# Patient Record
Sex: MALE | Race: WHITE | Employment: UNEMPLOYED
[De-identification: names, ages, dates, MRNs, and addresses within clinical notes are randomized per-mention and may not be internally consistent; named-entity substitution may affect disease eponyms.]

---

## 2022-02-08 ENCOUNTER — NURSE TRIAGE (OUTPATIENT)
Dept: OTHER | Facility: CLINIC | Age: 27
End: 2022-02-08

## 2022-02-08 NOTE — TELEPHONE ENCOUNTER
Received call from Alejandro Perdomo at [facility] with [employer] Complaint; Patient with Red Flag Complaint requesting to establish care with Dr. Boris Grullon. Subjective: Caller states \"Last night he still just has this constant headache. Everyday now. Not the extreme dizzy he had, but an off balance type feeling. The fatigue, just doesn't seem to be getting any better at all. \"     Covid + on 1/12/22, was seen at [facility] ED on 1/19 and treated for early stages of pneumonia with steroids and abx. Limited Triage d/t patient not being with caller    Current Symptoms: Slight dry cough-improved, constant bad headache, fatigue, dizzy spells    Family Hx of Vertigo    Onset: 1 weeks ago; worsening d/t dizziness and headache more severe    Associated Symptoms: increased sleepiness    Pain Severity: Patient states \"bad\" headache    Temperature: Denies     What has been tried: Ibuprofen-helped, Vertigo medication (Meclazine) from Father/brother that have Vertigo- helped, Dramamine- helps, Electrolyte drinks    LMP: NA Pregnant: NA    Recommended disposition: See PCP within 24 Hours. Writer did advise mother for patient to be seen at [facility]/ED if unable to get a new patient appointment in the next 24 hours. Mother states she has an appointment scheduled for today at [phone] at [facility] for patient to be seen. She will take him to that appointment, but would like to schedule appointment for him to establish care with new PCP as well. Care advice provided, patient verbalizes understanding; denies any other questions or concerns; instructed to call back for any new or worsening symptoms. Writer provided warm transfer to [place] at [facility] for appointment scheduling. Attention Provider: Thank you for allowing me to participate in the care of your patient. The patient was connected to triage in response to information provided to the [facility].   Please do not respond through this encounter as the response is not directed to a shared pool.    Reason for Disposition   [1] PERSISTING SYMPTOMS OF COVID-19 AND [2] symptoms WORSE    Protocols used: CORONAVIRUS (COVID-19) PERSISTING SYMPTOMS FOLLOW-UP CALL-ADULT-

## 2022-04-19 ENCOUNTER — OFFICE VISIT (OUTPATIENT)
Dept: INTERNAL MEDICINE CLINIC | Age: 27
End: 2022-04-19
Payer: COMMERCIAL

## 2022-04-19 VITALS
DIASTOLIC BLOOD PRESSURE: 90 MMHG | RESPIRATION RATE: 16 BRPM | TEMPERATURE: 98.7 F | HEIGHT: 72 IN | BODY MASS INDEX: 38.41 KG/M2 | HEART RATE: 99 BPM | WEIGHT: 283.6 LBS | OXYGEN SATURATION: 98 % | SYSTOLIC BLOOD PRESSURE: 140 MMHG

## 2022-04-19 DIAGNOSIS — Z71.85 VACCINE COUNSELING: ICD-10-CM

## 2022-04-19 DIAGNOSIS — Z00.00 LABORATORY TESTS ORDERED AS PART OF A COMPLETE PHYSICAL EXAM (CPE): ICD-10-CM

## 2022-04-19 DIAGNOSIS — Z00.00 PHYSICAL EXAM: Primary | ICD-10-CM

## 2022-04-19 DIAGNOSIS — Z86.16 HISTORY OF COVID-19: ICD-10-CM

## 2022-04-19 DIAGNOSIS — E66.01 CLASS 2 SEVERE OBESITY DUE TO EXCESS CALORIES WITH SERIOUS COMORBIDITY AND BODY MASS INDEX (BMI) OF 38.0 TO 38.9 IN ADULT (HCC): ICD-10-CM

## 2022-04-19 DIAGNOSIS — I10 ESSENTIAL HYPERTENSION: ICD-10-CM

## 2022-04-19 PROCEDURE — 99204 OFFICE O/P NEW MOD 45 MIN: CPT | Performed by: NURSE PRACTITIONER

## 2022-04-19 PROCEDURE — 93000 ELECTROCARDIOGRAM COMPLETE: CPT | Performed by: NURSE PRACTITIONER

## 2022-04-19 RX ORDER — VALSARTAN AND HYDROCHLOROTHIAZIDE 80; 12.5 MG/1; MG/1
1 TABLET, FILM COATED ORAL DAILY
Qty: 90 TABLET | Refills: 0 | Status: SHIPPED | OUTPATIENT
Start: 2022-04-19

## 2022-04-19 NOTE — PROGRESS NOTES
Saad Mendoza is a 33 y.o. male    Chief Complaint   Patient presents with    \[facility] [place] [facility]\"" Care     New patient       Visit Vitals  BP (!) 140/90 (BP 1 Location: Left upper arm, BP Patient Position: Sitting, BP Cuff Size: Large adult)   Pulse 99   Temp 98.7 °F (37.1 °C)   Resp 16   Ht 6' (1.829 m)   Wt 283 lb 9.6 oz (128.6 kg)   SpO2 98%   BMI 38.46 kg/m²           1. Have you been to the ER, urgent care clinic since your last visit? Hospitalized since your last visit? NO    2. Have you seen or consulted any other health care providers outside of the [address] [place] [address] since your last visit? Include any pap smears or colon screening.  NO

## 2022-04-19 NOTE — PROGRESS NOTES
[facility]    Chief Complaint   Patient presents with    Establish Care     New patient       SUBJECTIVE:    Flor León is a 33 y.o. male who is here today to establish care as a new patient, and to discuss current concerns regarding blood pressure. The patient states he was seen a few months ago at [facility] ER for evaluation and treatment of COVID-19 infection. At that time, he was found to have an excessively high blood pressure reading, and was recommended to find a PCP to treat this. He was not given medication for his blood pressure at that time. He denies any symptoms including: Chest pain, chest pressure, shortness of breath, headaches, dizziness, blurred vision, palpitations, or syncope episodes. He has not been checking his blood pressures since he was told he had an elevated blood pressure. He has not been vaccinated for COVID-19. He is unsure if he intends to get this or not. Current Outpatient Medications   Medication Sig Dispense Refill    valsartan-hydroCHLOROthiazide (DIOVAN-HCT) 80-12.5 mg per tablet Take 1 Tablet by mouth daily. 90 Tablet 0    oxyCODONE-acetaminophen (PERCOCET) 5-325 mg per tablet Take 2 Tabs by mouth every six (6) hours as needed for Pain. (Patient not taking: Reported on 4/19/2022) 50 Tab 0    oxyCODONE-acetaminophen (PERCOCET) 5-325 mg per tablet Take 2 Tabs by mouth every six (6) hours as needed for Pain. (Patient not taking: Reported on 4/19/2022) 50 Tab 0    oxyCODONE-acetaminophen (PERCOCET) 5-325 mg per tablet Take 1 Tab by mouth every four (4) hours as needed. (Patient not taking: Reported on 4/19/2022) 40 Tab 0    MULTIVITAMIN (MULTIPLE VITAMINS PO) Take 1 Tab by mouth daily. (Patient not taking: Reported on 4/19/2022)       Past Medical History:   Diagnosis Date    HX OTHER MEDICAL dec.  2011    pilonidal cysts    Obesity     Other ill-defined conditions(799.89)     pilonidal cysts with surgery    Pilonidal cyst surgery 5/2013     Past Surgical History:   Procedure Laterality Date    HX OTHER SURGICAL      PILONIDAL CYST X4    HX OTHER SURGICAL      5 pilondial cyst surgeries     Allergies   Allergen Reactions    Penicillins Rash    Penicillins Rash    Sulfa (Sulfonamide Antibiotics) Hives    Sulfa (Sulfonamide Antibiotics) Rash       REVIEW OF SYSTEMS:                                        POSITIVE= bold text  Negative = regular text    General:                     fever, chills, sweats, generalized weakness, weight loss/gain,                                       loss of appetite   Eyes:                           blurred vision, eye pain, loss of vision, double vision  ENT:                            rhinorrhea, pharyngitis   Respiratory:               cough, sputum production, SOB, BARRON, wheezing, pleuritic pain   Cardiology:                chest pain, palpitations, orthopnea, PND, edema, syncope   Gastrointestinal:       abdominal pain , N/V, diarrhea, dysphagia, constipation, bleeding   Genitourinary:           frequency, urgency, dysuria, hematuria, incontinence   Muskuloskeletal :      arthralgia, myalgia, back pain  Hematology:              easy bruising, nose or gum bleeding, lymphadenopathy   Dermatological:         rash, ulceration, pruritis, color change / jaundice  Endocrine:                 hot flashes or polydipsia   Neurological:             headache, dizziness, confusion, focal weakness, paresthesia,                                      Speech difficulties, memory loss, gait difficulty  Psychological:          Feelings of anxiety, depression, agitation        Social History     Socioeconomic History    Marital status: SINGLE   Tobacco Use    Smoking status: Never Smoker    Smokeless tobacco: Never Used   Vaping Use    Vaping Use: Never used   Substance and Sexual Activity    Alcohol use: No    Drug use: No   Social History Narrative    ** Merged History Encounter **          Family History Problem Relation Age of Onset    Post-op Nausea/Vomiting Mother     Hypertension Paternal Grandfather        OBJECTIVE:     Visit Vitals  BP (!) 140/90 (BP 1 Location: Left upper arm, BP Patient Position: Sitting, BP Cuff Size: Large adult)   Pulse 99   Temp 98.7 °F (37.1 °C)   Resp 16   Ht 6' (1.829 m)   Wt 283 lb 9.6 oz (128.6 kg)   SpO2 98%   BMI 38.46 kg/m²       Constitutional: He appears well nourished, of stated age, and dressed appropriately. Eyes: Sclera anicteric, PERRLA, EOMI  Neck: Supple without lymphadenopathy. Thyroid normal, No JVD or bruits  Respiratory: Clear to ascultation X5, normal inspiratory effort, no adventitious breath sounds. Cardiovascular: Regular rate and rhythm, no significant murmurs, rubs or gallops, PMI not displaced, No thrills, no peripheral edema  Hematologic: No purpura, petechiae or unexplained bruising  Lymphatic: No lymph node enlargemant. Integumentary: No unusual rashes or suspicious skin lesions noted. Neuro: Non-focal exam, A & O X 3.   Psychiatric: Appropriate affect and demeanor, pleasant and cooperative. Patient's thought content and thought processing appear to be within normal limits. ASSESSMENT/PLAN:     ICD-10-CM ICD-9-CM    1. Physical exam  Z00.00 V70.9    2. Essential hypertension  I10 401.9 AMB POC EKG ROUTINE W/ 12 LEADS, INTER & REP      valsartan-hydroCHLOROthiazide (DIOVAN-HCT) 80-12.5 mg per tablet      CBC W/O DIFF      METABOLIC PANEL, COMPREHENSIVE      LIPID PANEL      TSH 3RD GENERATION   3. Class 2 severe obesity due to excess calories with serious comorbidity and body mass index (BMI) of 38.0 to 38.9 in adult (HCC)  E66.01 278.01     Z68.38 V85.38    4. Laboratory tests ordered as part of a complete physical exam (CPE)  Z00.00 V72.62 CBC W/O DIFF      METABOLIC PANEL, COMPREHENSIVE      LIPID PANEL      TSH 3RD GENERATION      URINALYSIS W/ RFLX MICROSCOPIC   5. History of COVID-19  Z86.16 V12.09    6.  Vaccine counseling  Z71.85 V65.49 1: Patient to return for fasting labs in the next 2 weeks including: CBC, CMP, lipid panel, TSH, and urinalysis. 2: Patient will be started on valsartan/HCTZ 80/12.5 mg 1 tablet each morning. Monitor blood pressures twice daily and record results. Bring to next appointment. 3: Work on healthy lifestyle management including: Low-fat/low-cholesterol diet, avoidance of concentrated sweets, adequate amounts of fiber and water, and regular exercise such as walking. 4: Patient counseled on COVID-19 vaccination today. 5: Patient to follow-up with me in approximately 6 weeks, or sooner as needed. Patient states understanding and agrees with plan. Orders Placed This Encounter    CBC W/O DIFF    METABOLIC PANEL, COMPREHENSIVE    LIPID PANEL    TSH 3RD GENERATION    URINALYSIS W/ RFLX MICROSCOPIC    AMB POC EKG ROUTINE W/ 12 LEADS, INTER & REP    valsartan-hydroCHLOROthiazide (DIOVAN-HCT) 80-12.5 mg per tablet         ATTENTION:   This medical record was transcribed using an electronic medical records system. Although proofread, it may and can contain electronic and spelling errors. Other human spelling and other errors may be present. Corrections may be executed at a later time. Please feel free to contact us for any clarifications as needed. Follow-up and Dispositions    · Return in about 6 weeks (around 5/31/2022) for Follow-up. Signed,  Tory Guzman.  Lisa Aguirre, MSN APRN FNP-BC

## 2023-05-06 RX ORDER — OXYCODONE HYDROCHLORIDE AND ACETAMINOPHEN 5; 325 MG/1; MG/1
2 TABLET ORAL EVERY 6 HOURS PRN
COMMUNITY
Start: 2013-05-22